# Patient Record
Sex: MALE | Race: WHITE | NOT HISPANIC OR LATINO | Employment: UNEMPLOYED | URBAN - NONMETROPOLITAN AREA
[De-identification: names, ages, dates, MRNs, and addresses within clinical notes are randomized per-mention and may not be internally consistent; named-entity substitution may affect disease eponyms.]

---

## 2022-07-16 ENCOUNTER — APPOINTMENT (OUTPATIENT)
Dept: GENERAL RADIOLOGY | Facility: HOSPITAL | Age: 32
End: 2022-07-16

## 2022-07-16 ENCOUNTER — HOSPITAL ENCOUNTER (EMERGENCY)
Facility: HOSPITAL | Age: 32
Discharge: HOME | End: 2022-07-16
Admitting: FAMILY MEDICINE

## 2022-07-16 VITALS
RESPIRATION RATE: 18 BRPM | HEART RATE: 90 BPM | OXYGEN SATURATION: 98 % | TEMPERATURE: 97.5 F | SYSTOLIC BLOOD PRESSURE: 134 MMHG | WEIGHT: 171 LBS | DIASTOLIC BLOOD PRESSURE: 79 MMHG | BODY MASS INDEX: 25.33 KG/M2 | HEIGHT: 69 IN

## 2022-07-16 DIAGNOSIS — S99.922A INJURY OF LEFT FOOT, INITIAL ENCOUNTER: Primary | ICD-10-CM

## 2022-07-16 DIAGNOSIS — R22.42 LOCALIZED SWELLING OF LEFT FOOT: ICD-10-CM

## 2022-07-16 PROCEDURE — 73630 X-RAY EXAM OF FOOT: CPT

## 2022-07-16 PROCEDURE — 99282 EMERGENCY DEPT VISIT SF MDM: CPT

## 2022-07-16 RX ORDER — IBUPROFEN 800 MG/1
800 TABLET ORAL ONCE
Status: DISCONTINUED | OUTPATIENT
Start: 2022-07-16 | End: 2022-07-16 | Stop reason: HOSPADM

## 2022-07-16 NOTE — DISCHARGE INSTRUCTIONS
Please return to ED if the foot pain worsens, starts to develop numbness or tingling, shortness of breath, fevers/chills.

## 2022-07-16 NOTE — ED PROVIDER NOTES
Subjective   History of Present Illness    32 yr old male presented to ED after he injured his left foot with a pressure wash causing mild superficial laceration on great toe, swelling in the foot region. Patient reports he has not been able to bare any weight on the foot. Reports he is up to date with vaccines.     Review of Systems   Constitutional: Negative for activity change, appetite change and fatigue.   Musculoskeletal:        Left foot injury and laceration and bleeding    Skin: Negative for color change.   Neurological: Negative for dizziness, weakness, light-headedness and headaches.       History reviewed. No pertinent past medical history.    No Known Allergies    History reviewed. No pertinent surgical history.    History reviewed. No pertinent family history.    Social History     Socioeconomic History   • Marital status:            Objective   Physical Exam  Vitals and nursing note reviewed.   Constitutional:       General: He is not in acute distress.     Appearance: Normal appearance. He is not ill-appearing, toxic-appearing or diaphoretic.   Musculoskeletal:      Left foot: Normal range of motion and normal capillary refill. Swelling, deformity, laceration and tenderness present. No bony tenderness. Normal pulse.      Comments: Approximately 1.5 cm laceration on the thumb region, bleeding controlled with pressure. Superficial laceration.    Neurological:      Mental Status: He is alert.         Procedures           ED Course  ED Course as of 07/16/22 2115   Sat Jul 16, 2022   1501 Declined Ibuprofen for pain  [LN]      ED Course User Index  [LN] Young Quintero MD                                           MDM  Number of Diagnoses or Management Options  Injury of left foot, initial encounter  Localized swelling of left foot  Diagnosis management comments: Patient presented to ED with stable vitals. Patient refused any pain medications. X-ray of the foot did not show any fractures or  dislocation. Patient was advised to put ice on the foot and take otc nsaids. Patient is to follow up with pcp in 3 days.        Amount and/or Complexity of Data Reviewed  Tests in the radiology section of CPT®: reviewed        Final diagnoses:   Injury of left foot, initial encounter   Localized swelling of left foot       ED Disposition  ED Disposition     ED Disposition   Discharge    Condition   Stable    Comment   --             Young Quintero MD  [address] [place] [facility] [address]  [phone]    Schedule an appointment as soon as possible for a visit in 1 week           Medication List      No changes were made to your prescriptions during this visit.             Young Quintero MD PGY-3  [facility]   This document has been electronically signed by Young Quintero MD on July 16, 2022 21:16 CDT           Young Quintero MD  Resident  07/16/22 [id number]